# Patient Record
Sex: MALE | Race: WHITE | NOT HISPANIC OR LATINO
[De-identification: names, ages, dates, MRNs, and addresses within clinical notes are randomized per-mention and may not be internally consistent; named-entity substitution may affect disease eponyms.]

---

## 2018-02-07 ENCOUNTER — APPOINTMENT (OUTPATIENT)
Dept: PEDIATRIC SURGERY | Facility: CLINIC | Age: 23
End: 2018-02-07
Payer: COMMERCIAL

## 2018-02-07 VITALS
BODY MASS INDEX: 27.49 KG/M2 | HEIGHT: 65.98 IN | SYSTOLIC BLOOD PRESSURE: 117 MMHG | HEART RATE: 74 BPM | WEIGHT: 171.08 LBS | DIASTOLIC BLOOD PRESSURE: 48 MMHG

## 2018-02-07 PROCEDURE — 99213 OFFICE O/P EST LOW 20 MIN: CPT

## 2018-02-14 ENCOUNTER — OUTPATIENT (OUTPATIENT)
Dept: OUTPATIENT SERVICES | Facility: HOSPITAL | Age: 23
LOS: 1 days | End: 2018-02-14

## 2018-02-14 VITALS
TEMPERATURE: 98 F | WEIGHT: 175.05 LBS | HEART RATE: 84 BPM | DIASTOLIC BLOOD PRESSURE: 68 MMHG | SYSTOLIC BLOOD PRESSURE: 100 MMHG | HEIGHT: 67 IN | RESPIRATION RATE: 14 BRPM

## 2018-02-14 DIAGNOSIS — Q38.0 CONGENITAL MALFORMATIONS OF LIPS, NOT ELSEWHERE CLASSIFIED: Chronic | ICD-10-CM

## 2018-02-14 DIAGNOSIS — L98.8 OTHER SPECIFIED DISORDERS OF THE SKIN AND SUBCUTANEOUS TISSUE: ICD-10-CM

## 2018-02-14 LAB
HCT VFR BLD CALC: 41.2 % — SIGNIFICANT CHANGE UP (ref 39–50)
HGB BLD-MCNC: 14.2 G/DL — SIGNIFICANT CHANGE UP (ref 13–17)
MCHC RBC-ENTMCNC: 31.4 PG — SIGNIFICANT CHANGE UP (ref 27–34)
MCHC RBC-ENTMCNC: 34.5 % — SIGNIFICANT CHANGE UP (ref 32–36)
MCV RBC AUTO: 91.2 FL — SIGNIFICANT CHANGE UP (ref 80–100)
NRBC # FLD: 0 — SIGNIFICANT CHANGE UP
PLATELET # BLD AUTO: 158 K/UL — SIGNIFICANT CHANGE UP (ref 150–400)
PMV BLD: 13.2 FL — HIGH (ref 7–13)
RBC # BLD: 4.52 M/UL — SIGNIFICANT CHANGE UP (ref 4.2–5.8)
RBC # FLD: 12.6 % — SIGNIFICANT CHANGE UP (ref 10.3–14.5)
WBC # BLD: 10.09 K/UL — SIGNIFICANT CHANGE UP (ref 3.8–10.5)
WBC # FLD AUTO: 10.09 K/UL — SIGNIFICANT CHANGE UP (ref 3.8–10.5)

## 2018-02-14 RX ORDER — SODIUM CHLORIDE 9 MG/ML
1000 INJECTION, SOLUTION INTRAVENOUS
Qty: 0 | Refills: 0 | Status: DISCONTINUED | OUTPATIENT
Start: 2018-02-16 | End: 2018-03-03

## 2018-02-14 NOTE — H&P PST ADULT - LYMPHATIC
supraclavicular R/supraclavicular L/posterior cervical L/posterior cervical R/anterior cervical R/anterior cervical L

## 2018-02-14 NOTE — H&P PST ADULT - HISTORY OF PRESENT ILLNESS
This is a 22 y.o. male who presented to Dr Sosa complaining of pain at base of spine . Pt evaluated . Pt has pilonidal cyst. Pt no for surgery . This is a 22 y.o. male who presented to Dr Sosa complaining of pain at base of spine . Pt evaluated . Pt has pilonidal cyst. Pt now for surgery .

## 2018-02-14 NOTE — H&P PST ADULT - NSANTHOSAYNRD_GEN_A_CORE
No. MILLI screening performed.  STOP BANG Legend: 0-2 = LOW Risk; 3-4 = INTERMEDIATE Risk; 5-8 = HIGH Risk

## 2018-02-14 NOTE — H&P PST ADULT - MUSCULOSKELETAL
details… detailed exam no joint erythema/normal strength/ROM intact/no joint warmth/no calf tenderness/no joint swelling

## 2018-02-16 ENCOUNTER — RESULT REVIEW (OUTPATIENT)
Age: 23
End: 2018-02-16

## 2018-02-16 ENCOUNTER — OUTPATIENT (OUTPATIENT)
Dept: OUTPATIENT SERVICES | Facility: HOSPITAL | Age: 23
LOS: 1 days | Discharge: HOME | End: 2018-02-16
Payer: COMMERCIAL

## 2018-02-16 VITALS
RESPIRATION RATE: 16 BRPM | DIASTOLIC BLOOD PRESSURE: 52 MMHG | SYSTOLIC BLOOD PRESSURE: 118 MMHG | HEART RATE: 76 BPM | OXYGEN SATURATION: 100 %

## 2018-02-16 VITALS
HEART RATE: 75 BPM | TEMPERATURE: 98 F | RESPIRATION RATE: 16 BRPM | DIASTOLIC BLOOD PRESSURE: 52 MMHG | WEIGHT: 175.05 LBS | OXYGEN SATURATION: 98 % | HEIGHT: 67 IN | SYSTOLIC BLOOD PRESSURE: 111 MMHG

## 2018-02-16 DIAGNOSIS — Q38.0 CONGENITAL MALFORMATIONS OF LIPS, NOT ELSEWHERE CLASSIFIED: Chronic | ICD-10-CM

## 2018-02-16 DIAGNOSIS — L98.8 OTHER SPECIFIED DISORDERS OF THE SKIN AND SUBCUTANEOUS TISSUE: ICD-10-CM

## 2018-02-16 PROCEDURE — 88304 TISSUE EXAM BY PATHOLOGIST: CPT | Mod: 26

## 2018-02-16 PROCEDURE — 11772 EXC PILONIDAL CYST COMP: CPT

## 2018-02-16 RX ORDER — IBUPROFEN 200 MG
3 TABLET ORAL
Qty: 0 | Refills: 0 | COMMUNITY

## 2018-02-16 RX ADMIN — SODIUM CHLORIDE 30 MILLILITER(S): 9 INJECTION, SOLUTION INTRAVENOUS at 13:35

## 2018-02-16 NOTE — CHART NOTE - NSCHARTNOTEFT_GEN_A_CORE
BRIEF OP NOTE    PreOpDx: Pilonidal cyst, recurrent  PostOp Dx: same  Procedure: minimal excision of recurrent pilonidal cyst  Findings: Multiple pits noted along midline cleft. Multiple punch biopsies performed encircling pits, tracts curetted. Tissue sent as specimen, containing hair  Specimen: pilonidal cyst contents  EBL: 15cc  Condition post op: stable  Dispo: PACU, home

## 2018-02-16 NOTE — ASU DISCHARGE PLAN (ADULT/PEDIATRIC). - MEDICATION SUMMARY - MEDICATIONS TO TAKE
I will START or STAY ON the medications listed below when I get home from the hospital:    Advil Liquigel 200 mg oral capsule  -- 3 cap(s) by mouth every 4 hours. Last dose 2/2/18  -- Indication: For pain control

## 2018-02-16 NOTE — ASU DISCHARGE PLAN (ADULT/PEDIATRIC). - NOTIFY
Fever greater than 101/Unable to Urinate/Pain not relieved by Medications/Swelling that continues/Bleeding that does not stop Increased Irritability or Sluggishness/Inability to Tolerate Liquids or Foods/Persistent Nausea and Vomiting/Unable to Urinate/Bleeding that does not stop/Swelling that continues/Pain not relieved by Medications/Fever greater than 101

## 2018-02-16 NOTE — ASU DISCHARGE PLAN (ADULT/PEDIATRIC). - SPECIAL INSTRUCTIONS
WOUND CARE: Please keep the area clean and dry. You may shower tomorrow. Allow soap and water to run over the surgical site, without scrubbing directly on incision. Pat dry gently.    PAIN CONTROL: Please take Tylenol or Advil for pain control, as instructed on the packaging. Do not exceed maximum doses. If pain is not controlled with these medications, please call the office.    SCHOOL: You may return to school/work as tolerated.    FOLLOW UP: Please follow up with Dr. Sosa in 2 weeks. You may call the office to make an appointment at your convenience.

## 2018-02-18 ENCOUNTER — TRANSCRIPTION ENCOUNTER (OUTPATIENT)
Age: 23
End: 2018-02-18

## 2018-02-22 LAB — SURGICAL PATHOLOGY STUDY: SIGNIFICANT CHANGE UP

## 2018-03-12 ENCOUNTER — APPOINTMENT (OUTPATIENT)
Dept: PEDIATRIC SURGERY | Facility: CLINIC | Age: 23
End: 2018-03-12

## 2018-03-12 ENCOUNTER — APPOINTMENT (OUTPATIENT)
Dept: PEDIATRIC SURGERY | Facility: CLINIC | Age: 23
End: 2018-03-12
Payer: COMMERCIAL

## 2018-03-12 VITALS
BODY MASS INDEX: 27.43 KG/M2 | DIASTOLIC BLOOD PRESSURE: 58 MMHG | HEIGHT: 65.91 IN | HEART RATE: 58 BPM | WEIGHT: 168.65 LBS | SYSTOLIC BLOOD PRESSURE: 103 MMHG

## 2018-03-12 PROCEDURE — 99024 POSTOP FOLLOW-UP VISIT: CPT

## 2018-04-23 ENCOUNTER — APPOINTMENT (OUTPATIENT)
Dept: PEDIATRIC SURGERY | Facility: CLINIC | Age: 23
End: 2018-04-23
Payer: COMMERCIAL

## 2018-04-23 PROCEDURE — 99024 POSTOP FOLLOW-UP VISIT: CPT

## 2018-05-10 ENCOUNTER — APPOINTMENT (OUTPATIENT)
Dept: PEDIATRIC SURGERY | Facility: CLINIC | Age: 23
End: 2018-05-10
Payer: COMMERCIAL

## 2018-05-10 VITALS — WEIGHT: 170.2 LBS | TEMPERATURE: 98.06 F

## 2018-05-10 PROCEDURE — 11772 EXC PILONIDAL CYST COMP: CPT | Mod: 76,58

## 2018-05-10 PROCEDURE — 99024 POSTOP FOLLOW-UP VISIT: CPT

## 2022-01-21 ENCOUNTER — EMERGENCY (EMERGENCY)
Facility: HOSPITAL | Age: 27
LOS: 1 days | Discharge: HOME | End: 2022-01-21
Attending: STUDENT IN AN ORGANIZED HEALTH CARE EDUCATION/TRAINING PROGRAM | Admitting: STUDENT IN AN ORGANIZED HEALTH CARE EDUCATION/TRAINING PROGRAM
Payer: COMMERCIAL

## 2022-01-21 VITALS
DIASTOLIC BLOOD PRESSURE: 68 MMHG | SYSTOLIC BLOOD PRESSURE: 138 MMHG | OXYGEN SATURATION: 100 % | TEMPERATURE: 97 F | RESPIRATION RATE: 16 BRPM | HEIGHT: 67 IN | HEART RATE: 79 BPM

## 2022-01-21 VITALS
HEART RATE: 86 BPM | SYSTOLIC BLOOD PRESSURE: 110 MMHG | RESPIRATION RATE: 16 BRPM | OXYGEN SATURATION: 100 % | DIASTOLIC BLOOD PRESSURE: 66 MMHG | TEMPERATURE: 99 F

## 2022-01-21 DIAGNOSIS — Q38.0 CONGENITAL MALFORMATIONS OF LIPS, NOT ELSEWHERE CLASSIFIED: Chronic | ICD-10-CM

## 2022-01-21 LAB
ALBUMIN SERPL ELPH-MCNC: 4.8 G/DL — SIGNIFICANT CHANGE UP (ref 3.3–5)
ALP SERPL-CCNC: 60 U/L — SIGNIFICANT CHANGE UP (ref 40–120)
ALT FLD-CCNC: 21 U/L — SIGNIFICANT CHANGE UP (ref 4–41)
ANION GAP SERPL CALC-SCNC: 10 MMOL/L — SIGNIFICANT CHANGE UP (ref 7–14)
ANISOCYTOSIS BLD QL: SLIGHT — SIGNIFICANT CHANGE UP
AST SERPL-CCNC: 34 U/L — SIGNIFICANT CHANGE UP (ref 4–40)
BASOPHILS # BLD AUTO: 0.17 K/UL — SIGNIFICANT CHANGE UP (ref 0–0.2)
BASOPHILS NFR BLD AUTO: 1.7 % — SIGNIFICANT CHANGE UP (ref 0–2)
BILIRUB SERPL-MCNC: 0.5 MG/DL — SIGNIFICANT CHANGE UP (ref 0.2–1.2)
BUN SERPL-MCNC: 17 MG/DL — SIGNIFICANT CHANGE UP (ref 7–23)
CALCIUM SERPL-MCNC: 9.4 MG/DL — SIGNIFICANT CHANGE UP (ref 8.4–10.5)
CHLORIDE SERPL-SCNC: 103 MMOL/L — SIGNIFICANT CHANGE UP (ref 98–107)
CO2 SERPL-SCNC: 23 MMOL/L — SIGNIFICANT CHANGE UP (ref 22–31)
CREAT SERPL-MCNC: 0.88 MG/DL — SIGNIFICANT CHANGE UP (ref 0.5–1.3)
EOSINOPHIL # BLD AUTO: 0 K/UL — SIGNIFICANT CHANGE UP (ref 0–0.5)
EOSINOPHIL NFR BLD AUTO: 0 % — SIGNIFICANT CHANGE UP (ref 0–6)
GIANT PLATELETS BLD QL SMEAR: PRESENT — SIGNIFICANT CHANGE UP
GLUCOSE SERPL-MCNC: 95 MG/DL — SIGNIFICANT CHANGE UP (ref 70–99)
HCT VFR BLD CALC: 45.9 % — SIGNIFICANT CHANGE UP (ref 39–50)
HGB BLD-MCNC: 15.7 G/DL — SIGNIFICANT CHANGE UP (ref 13–17)
IANC: 7.12 K/UL — SIGNIFICANT CHANGE UP (ref 1.5–8.5)
LYMPHOCYTES # BLD AUTO: 2.22 K/UL — SIGNIFICANT CHANGE UP (ref 1–3.3)
LYMPHOCYTES # BLD AUTO: 22.5 % — SIGNIFICANT CHANGE UP (ref 13–44)
MACROCYTES BLD QL: SLIGHT — SIGNIFICANT CHANGE UP
MCHC RBC-ENTMCNC: 30.4 PG — SIGNIFICANT CHANGE UP (ref 27–34)
MCHC RBC-ENTMCNC: 34.2 GM/DL — SIGNIFICANT CHANGE UP (ref 32–36)
MCV RBC AUTO: 89 FL — SIGNIFICANT CHANGE UP (ref 80–100)
MONOCYTES # BLD AUTO: 0.4 K/UL — SIGNIFICANT CHANGE UP (ref 0–0.9)
MONOCYTES NFR BLD AUTO: 4.1 % — SIGNIFICANT CHANGE UP (ref 2–14)
NEUTROPHILS # BLD AUTO: 6.65 K/UL — SIGNIFICANT CHANGE UP (ref 1.8–7.4)
NEUTROPHILS NFR BLD AUTO: 67.5 % — SIGNIFICANT CHANGE UP (ref 43–77)
PLAT MORPH BLD: NORMAL — SIGNIFICANT CHANGE UP
PLATELET # BLD AUTO: 169 K/UL — SIGNIFICANT CHANGE UP (ref 150–400)
PLATELET COUNT - ESTIMATE: NORMAL — SIGNIFICANT CHANGE UP
POIKILOCYTOSIS BLD QL AUTO: SLIGHT — SIGNIFICANT CHANGE UP
POLYCHROMASIA BLD QL SMEAR: SLIGHT — SIGNIFICANT CHANGE UP
POTASSIUM SERPL-MCNC: 5.4 MMOL/L — HIGH (ref 3.5–5.3)
POTASSIUM SERPL-SCNC: 5.4 MMOL/L — HIGH (ref 3.5–5.3)
PROT SERPL-MCNC: 7.9 G/DL — SIGNIFICANT CHANGE UP (ref 6–8.3)
RBC # BLD: 5.16 M/UL — SIGNIFICANT CHANGE UP (ref 4.2–5.8)
RBC # FLD: 12.3 % — SIGNIFICANT CHANGE UP (ref 10.3–14.5)
RBC BLD AUTO: ABNORMAL
SODIUM SERPL-SCNC: 136 MMOL/L — SIGNIFICANT CHANGE UP (ref 135–145)
VARIANT LYMPHS # BLD: 4.2 % — SIGNIFICANT CHANGE UP (ref 0–6)
WBC # BLD: 9.85 K/UL — SIGNIFICANT CHANGE UP (ref 3.8–10.5)
WBC # FLD AUTO: 9.85 K/UL — SIGNIFICANT CHANGE UP (ref 3.8–10.5)

## 2022-01-21 PROCEDURE — 99284 EMERGENCY DEPT VISIT MOD MDM: CPT | Mod: 25

## 2022-01-21 PROCEDURE — 73130 X-RAY EXAM OF HAND: CPT | Mod: 26,RT

## 2022-01-21 PROCEDURE — 29125 APPL SHORT ARM SPLINT STATIC: CPT

## 2022-01-21 NOTE — ED PROVIDER NOTE - ATTENDING CONTRIBUTION TO CARE
I have personally seen and examined this patient.  I have fully participated in the care of this patient. I performed a substantive portion of the visit including all aspects of the medical decision making. I have reviewed all pertinent clinical information, including history, physical exam, plan and the ACP’s note and agree except as noted. - MD Musa.    27 yo M, without significant PMH, p/w right hand, bruise and discoloration of the distal finger on 2nd-3rd finger, found this morning. denies trauma but + snaff box tenderness, potent scaphoid injury, will get xray and follow up for another xray in 5-7 days. Unsure etiology, dxx incuding spontaneous bleeding or microvascular construction, plt count will be checked, but no clinical sings of other critical conditions such as flexor tendinitis. xray, cbc, d/c.

## 2022-01-21 NOTE — ED ADULT TRIAGE NOTE - CHIEF COMPLAINT QUOTE
pt stating woke up this morning with right hand "finger tips and palm bruising", denies injury or pain at this time. Capillary refill <3, denies loss of sensation

## 2022-01-21 NOTE — ED PROVIDER NOTE - OBJECTIVE STATEMENT
27 yo non smoker, no PMHX here with complaint of discoloration to right hand x this morning, woke this morning noticed discoloration/ blue in color to base of thumb and 4th and 5th finger tip, now with improvement, however notice now a dry area to 3rd mid finger with some discoloration to skin. Denies fever, cp ,sob, swelling, injury, trauma, new medications. Denies h/o bleeding/ clotting d/o in himself or family.

## 2022-01-21 NOTE — ED PROVIDER NOTE - CLINICAL SUMMARY MEDICAL DECISION MAKING FREE TEXT BOX
25 yo non smoker, no PMHX here with complaint of discoloration to right hand x this morning, woke this morning noticed discoloration/ blue in color to base of thumb and 4th and 5th finger tip, now with improvement, however notice now a dry area to 3rd mid finger with some discoloration to skin.  xray labs, hand f/u

## 2022-01-21 NOTE — ED PROVIDER NOTE - PATIENT PORTAL LINK FT
You can access the FollowMyHealth Patient Portal offered by [facility] by registering at the following website: [email or web address]. By joining [email or web address]’s FollowMyHealth portal, you will also be able to view your health information using other applications (apps) compatible with our system.

## 2022-01-21 NOTE — ED PROVIDER NOTE - NSFOLLOWUPINSTRUCTIONS_ED_ALL_ED_FT
- take another hand xray in 5-7 days to r/o scaphoid fracture  - warm up, hot pads, follow up with plastic/hand out pt office.

## 2022-01-21 NOTE — ED ADULT NURSE NOTE - OBJECTIVE STATEMENT
25y/o male aaox4 and ambulatory c/o discoloration/ blue in color to base of thumb and 4th and 5th finger tip upon waking up this morning. Pt states now area shows improvement. Denies numbness/tingling, chest pain, sob. Vital signs as noted. Labs collected and sent by PA.

## 2022-01-21 NOTE — ED ADULT NURSE NOTE - NSIMPLEMENTINTERV_GEN_ALL_ED
Implemented All Universal Safety Interventions:  [place] to call system. Call bell, personal items and telephone within reach. Instruct patient to call for assistance. Room bathroom lighting operational. Non-slip footwear when patient is off stretcher. Physically safe environment: no spills, clutter or unnecessary equipment. Stretcher in lowest position, wheels locked, appropriate side rails in place.

## 2022-01-21 NOTE — ED PROVIDER NOTE - SKIN, MLM
Right hand with irregular small areas on 4th and 5th finger tip of hyperemia, faint, non diffuse, as well over thenar eminence. warm, dry and intact. No evidence of rash.

## 2023-03-24 ENCOUNTER — APPOINTMENT (OUTPATIENT)
Dept: UROLOGY | Facility: CLINIC | Age: 28
End: 2023-03-24
Payer: COMMERCIAL

## 2023-03-24 VITALS
WEIGHT: 185 LBS | BODY MASS INDEX: 29.73 KG/M2 | DIASTOLIC BLOOD PRESSURE: 78 MMHG | HEIGHT: 66 IN | SYSTOLIC BLOOD PRESSURE: 132 MMHG

## 2023-03-24 PROCEDURE — 99204 OFFICE O/P NEW MOD 45 MIN: CPT

## 2023-03-24 NOTE — ASSESSMENT
[FreeTextEntry1] : Mr. Sterling is a very pleasant 27 year old man here today for evaluation for pain with ejaculation, right testicular pain.\par We discussed potential etiologies of his symptoms\par UC.\par UA.\par GC/chlamydia.\par Ureaplasma/mycoplasma.\par Ibuprofen 600 mg BID.\par Warm baths/warming blanket 15 mins BID.\par Patient was reassured that the likelihood of an infection and cancer is very low\par RTO scrotal US 2 weeks.\par

## 2023-03-24 NOTE — HISTORY OF PRESENT ILLNESS
[Currently Experiencing ___] :  [unfilled] [2] : 2 [FreeTextEntry1] : Mr. Sterling is a very pleasant 27 year old man here today for evaluation for right testicular pain, pain with ejaculation\par He reports that this has been going on for the past year.\par He reports no pain with lifting weights; but notices increased urinary frequency with exercise.\par Additionally reports a few episodes of right inguinal throbbing pain separate from testicular pain.\par Reports its is intermittent in nature.\par Denies any obvious swelling, tenderness to touch or change in sensation.\par Additionally reports pain with ejaculation.\par Reports a strong urinary stream.\par Reports PCP did urine studies and renal US recently which did not demonstrate any evidence of kidney stones nor other abnormalities\par Denies any dysuria.\par Denies any personal or family history of kidney stones.\par Denies any family history of urological cancers.\par Denies any history of smoking.\par

## 2023-03-24 NOTE — PHYSICAL EXAM
[General Appearance - Well Developed] : well developed [General Appearance - Well Nourished] : well nourished [Normal Appearance] : normal appearance [Well Groomed] : well groomed [General Appearance - In No Acute Distress] : no acute distress [Edema] : no peripheral edema [Respiration, Rhythm And Depth] : normal respiratory rhythm and effort [Exaggerated Use Of Accessory Muscles For Inspiration] : no accessory muscle use [Abdomen Soft] : soft [Abdomen Tenderness] : non-tender [Costovertebral Angle Tenderness] : no ~M costovertebral angle tenderness [Normal Station and Gait] : the gait and station were normal for the patient's age [] : no rash [No Focal Deficits] : no focal deficits [Affect] : the affect was normal [Oriented To Time, Place, And Person] : oriented to person, place, and time [Mood] : the mood was normal [Not Anxious] : not anxious [No Palpable Adenopathy] : no palpable adenopathy [Urethral Meatus] : meatus normal [Penis Abnormality] : normal uncircumcised penis [Urinary Bladder Findings] : the bladder was normal on palpation [Testes Mass (___cm)] : there were no testicular masses

## 2023-03-27 LAB
APPEARANCE: CLEAR
BACTERIA UR CULT: NORMAL
BACTERIA: NEGATIVE
BILIRUBIN URINE: NEGATIVE
BLOOD URINE: NEGATIVE
C TRACH RRNA SPEC QL NAA+PROBE: NOT DETECTED
COLOR: NORMAL
GLUCOSE QUALITATIVE U: NEGATIVE
HYALINE CASTS: 0 /LPF
KETONES URINE: NEGATIVE
LEUKOCYTE ESTERASE URINE: NEGATIVE
MICROSCOPIC-UA: NORMAL
N GONORRHOEA RRNA SPEC QL NAA+PROBE: NOT DETECTED
NITRITE URINE: NEGATIVE
PH URINE: 6
PROTEIN URINE: NORMAL
RED BLOOD CELLS URINE: 1 /HPF
SOURCE AMPLIFICATION: NORMAL
SPECIFIC GRAVITY URINE: 1.03
SQUAMOUS EPITHELIAL CELLS: 0 /HPF
UROBILINOGEN URINE: NORMAL
WHITE BLOOD CELLS URINE: 0 /HPF

## 2023-03-31 ENCOUNTER — NON-APPOINTMENT (OUTPATIENT)
Age: 28
End: 2023-03-31

## 2023-04-13 LAB
MYCOPLASMA HOMINIS CULTURE: NEGATIVE
UREAPLASMA CULTURE: NEGATIVE

## 2023-04-14 ENCOUNTER — APPOINTMENT (OUTPATIENT)
Dept: UROLOGY | Facility: CLINIC | Age: 28
End: 2023-04-14
Payer: COMMERCIAL

## 2023-04-14 VITALS
HEIGHT: 66 IN | HEART RATE: 73 BPM | BODY MASS INDEX: 29.73 KG/M2 | SYSTOLIC BLOOD PRESSURE: 112 MMHG | WEIGHT: 185 LBS | TEMPERATURE: 208.22 F | DIASTOLIC BLOOD PRESSURE: 64 MMHG | OXYGEN SATURATION: 98 %

## 2023-04-14 DIAGNOSIS — N41.1 ACUTE PROSTATITIS: ICD-10-CM

## 2023-04-14 DIAGNOSIS — N41.0 ACUTE PROSTATITIS: ICD-10-CM

## 2023-04-14 DIAGNOSIS — N50.819 TESTICULAR PAIN, UNSPECIFIED: ICD-10-CM

## 2023-04-14 PROCEDURE — 76870 US EXAM SCROTUM: CPT

## 2023-04-14 PROCEDURE — 99213 OFFICE O/P EST LOW 20 MIN: CPT

## 2023-04-14 NOTE — HISTORY OF PRESENT ILLNESS
[FreeTextEntry1] : Very pleasant 27-year-old gentleman who presents for follow-up of scrotal pain with new finding today of bilateral small varicoceles.  He feels well.  He reports that his pain has improved.  He reports no testicular pain in the recent past.  He denies dysuria.  No hematuria.  No perineal discomfort.  No other complaints.

## 2023-04-14 NOTE — ASSESSMENT
[FreeTextEntry1] : Very pleasant 27-year-old gentleman who presents for follow-up of scrotal pain, prostatitis, new finding of bilateral varicoceles\par -We discussed pathophysiology of bilateral varicoceles\par -Scrotal ultrasound images reviewed demonstrating bilateral small varicoceles\par -Urine culture negative\par -GC/chlamydia negative\par -Ureaplasma/mycoplasma negative\par -Urinalysis demonstrates 1 red blood cell per high-power field\par -Follow-up as needed\par

## 2024-06-11 ENCOUNTER — APPOINTMENT (OUTPATIENT)
Dept: PEDIATRIC SURGERY | Facility: CLINIC | Age: 29
End: 2024-06-11

## 2024-06-11 VITALS — BODY MASS INDEX: 31.35 KG/M2 | WEIGHT: 194.23 LBS | TEMPERATURE: 98.4 F

## 2024-06-11 DIAGNOSIS — L98.8 OTHER SPECIFIED DISORDERS OF THE SKIN AND SUBCUTANEOUS TISSUE: ICD-10-CM

## 2024-06-11 PROCEDURE — XXXXX: CPT | Mod: 1L

## 2024-06-11 NOTE — CONSULT LETTER
[Dear  ___] : Dear  [unfilled], [Consult Letter:] : I had the pleasure of evaluating your patient, [unfilled]. [Please see my note below.] : Please see my note below. [Consult Closing:] : Thank you very much for allowing me to participate in the care of this patient.  If you have any questions, please do not hesitate to contact me. [Sincerely,] : Sincerely, [FreeTextEntry2] : Eduardo Shaffer MD [FreeTextEntry3] :  Mendoza Sosa MD FAAP FACS  Director, [facility]  Division of Pediatric General, Thoracic and Endoscopic Surgery  [facility]

## 2024-06-11 NOTE — ADDENDUM
[FreeTextEntry1] : Documented by Leobardo Garvey acting as a Scribe for THOR HARLEY on 06/11/2024.   All medical record entries made by the Scribe were at my direction and personally dictated by me, THOR HARLEY, on 06/11/2024. I have reviewed the chart and agree that the record accurately reflects my personal performances of the history, physical exam, assessment and plan. I have also personally directed, reviewed, and agree with the discharge instructions.

## 2024-06-11 NOTE — HISTORY OF PRESENT ILLNESS
[FreeTextEntry1] : Nicolas is a 29-year-old male with a history of pilonidal disease s/p cleft lift procedure (03/2014) and minimal excision procedure (02/2018), here today to be re-evaluated for recurring pilonidal cysts. He was last seen in clinic in May 2018 at which time he presented with persistent blood and discharge appreciated in the affected area post-operatively. An I&D was subsequently performed in-office. Today he reports pain and discomfort in the gluteal cleft for the last 3 months at the site of prior excision. He denies any associated bleeding or discharge. Denies any fevers. No issues with urination or bowel movements.

## 2024-06-11 NOTE — PHYSICAL EXAM
[Midline pits] : midline pits [NL] : grossly intact [TextBox_59] : 1 midline pit: swelling in sacrococcygeal region

## 2024-06-11 NOTE — ASSESSMENT
[FreeTextEntry1] : Nicolas is a 29-year-old male with a history of pilonidal disease s/p cleft lift procedure (03/2014) and minimal excision procedure (02/2018), here today to be re-evaluated for recurring cysts. He was last seen in clinic in May 2018 at which time he presented with persistent blood and discharge appreciated in the affected area post-operatively and an I&D was subsequently performed in-office. Today he reports pain and discomfort in the gluteal cleft for the last 3 months at the site of prior excision. On exam, there is 1 midline pit above the gluteal cleft along with hair and debris; swelling in the area, but no evidence of active infection. I re-educated Nicolas on expectant management with regular hair removal and proper hygiene and reviewed surgical options for long term treatment including a second minimal excision procedure. He is interested in scheduling the minimal excision when convenient for him. The risks discussed included but were not limited to bleeding, infection, injury to adjacent structures, and recurrent/persistent disease. He knows to contact me sooner should he develop any new or concerning symptoms or should he have any other questions prior to the procedure.

## 2024-06-11 NOTE — REASON FOR VISIT
[Initial - Scheduled] : an initial, scheduled visit with concerns of [Pilonidal disease] : pilonidal disease  [Patient] : patient [FreeTextEntry4] : Eduardo Shaffer MD

## 2024-11-09 ENCOUNTER — EMERGENCY (EMERGENCY)
Facility: HOSPITAL | Age: 29
LOS: 1 days | Discharge: HOME | End: 2024-11-09
Attending: EMERGENCY MEDICINE
Payer: COMMERCIAL

## 2024-11-09 VITALS
HEART RATE: 81 BPM | SYSTOLIC BLOOD PRESSURE: 104 MMHG | OXYGEN SATURATION: 99 % | TEMPERATURE: 98 F | RESPIRATION RATE: 18 BRPM | DIASTOLIC BLOOD PRESSURE: 68 MMHG

## 2024-11-09 VITALS
HEIGHT: 66 IN | DIASTOLIC BLOOD PRESSURE: 69 MMHG | WEIGHT: 190.04 LBS | RESPIRATION RATE: 18 BRPM | HEART RATE: 85 BPM | OXYGEN SATURATION: 98 % | TEMPERATURE: 99 F | SYSTOLIC BLOOD PRESSURE: 128 MMHG

## 2024-11-09 DIAGNOSIS — Q38.0 CONGENITAL MALFORMATIONS OF LIPS, NOT ELSEWHERE CLASSIFIED: Chronic | ICD-10-CM

## 2024-11-09 LAB
ALBUMIN SERPL ELPH-MCNC: 4.6 G/DL — SIGNIFICANT CHANGE UP (ref 3.3–5)
ALP SERPL-CCNC: 74 U/L — SIGNIFICANT CHANGE UP (ref 40–120)
ALT FLD-CCNC: 33 U/L — SIGNIFICANT CHANGE UP (ref 10–45)
ANION GAP SERPL CALC-SCNC: 13 MMOL/L — SIGNIFICANT CHANGE UP (ref 5–17)
APPEARANCE UR: CLEAR — SIGNIFICANT CHANGE UP
AST SERPL-CCNC: 20 U/L — SIGNIFICANT CHANGE UP (ref 10–40)
BACTERIA # UR AUTO: NEGATIVE /HPF — SIGNIFICANT CHANGE UP
BASOPHILS # BLD AUTO: 0.01 K/UL — SIGNIFICANT CHANGE UP (ref 0–0.2)
BASOPHILS NFR BLD AUTO: 0.2 % — SIGNIFICANT CHANGE UP (ref 0–2)
BILIRUB SERPL-MCNC: 0.6 MG/DL — SIGNIFICANT CHANGE UP (ref 0.2–1.2)
BILIRUB UR-MCNC: NEGATIVE — SIGNIFICANT CHANGE UP
BUN SERPL-MCNC: 15 MG/DL — SIGNIFICANT CHANGE UP (ref 7–23)
CALCIUM SERPL-MCNC: 9.4 MG/DL — SIGNIFICANT CHANGE UP (ref 8.4–10.5)
CAST: 0 /LPF — SIGNIFICANT CHANGE UP (ref 0–4)
CHLORIDE SERPL-SCNC: 100 MMOL/L — SIGNIFICANT CHANGE UP (ref 96–108)
CK SERPL-CCNC: 99 U/L — SIGNIFICANT CHANGE UP (ref 30–200)
CO2 SERPL-SCNC: 25 MMOL/L — SIGNIFICANT CHANGE UP (ref 22–31)
COLOR SPEC: YELLOW — SIGNIFICANT CHANGE UP
CREAT SERPL-MCNC: 1.17 MG/DL — SIGNIFICANT CHANGE UP (ref 0.5–1.3)
DIFF PNL FLD: NEGATIVE — SIGNIFICANT CHANGE UP
EGFR: 88 ML/MIN/1.73M2 — SIGNIFICANT CHANGE UP
EOSINOPHIL # BLD AUTO: 0.02 K/UL — SIGNIFICANT CHANGE UP (ref 0–0.5)
EOSINOPHIL NFR BLD AUTO: 0.4 % — SIGNIFICANT CHANGE UP (ref 0–6)
FLUAV AG NPH QL: SIGNIFICANT CHANGE UP
FLUBV AG NPH QL: SIGNIFICANT CHANGE UP
GLUCOSE SERPL-MCNC: 87 MG/DL — SIGNIFICANT CHANGE UP (ref 70–99)
GLUCOSE UR QL: NEGATIVE MG/DL — SIGNIFICANT CHANGE UP
HCT VFR BLD CALC: 45.1 % — SIGNIFICANT CHANGE UP (ref 39–50)
HGB BLD-MCNC: 15.3 G/DL — SIGNIFICANT CHANGE UP (ref 13–17)
IMM GRANULOCYTES NFR BLD AUTO: 0.4 % — SIGNIFICANT CHANGE UP (ref 0–0.9)
KETONES UR-MCNC: NEGATIVE MG/DL — SIGNIFICANT CHANGE UP
LEUKOCYTE ESTERASE UR-ACNC: NEGATIVE — SIGNIFICANT CHANGE UP
LYMPHOCYTES # BLD AUTO: 1.12 K/UL — SIGNIFICANT CHANGE UP (ref 1–3.3)
LYMPHOCYTES # BLD AUTO: 20 % — SIGNIFICANT CHANGE UP (ref 13–44)
MCHC RBC-ENTMCNC: 29.1 PG — SIGNIFICANT CHANGE UP (ref 27–34)
MCHC RBC-ENTMCNC: 33.9 G/DL — SIGNIFICANT CHANGE UP (ref 32–36)
MCV RBC AUTO: 85.9 FL — SIGNIFICANT CHANGE UP (ref 80–100)
MONOCYTES # BLD AUTO: 0.55 K/UL — SIGNIFICANT CHANGE UP (ref 0–0.9)
MONOCYTES NFR BLD AUTO: 9.8 % — SIGNIFICANT CHANGE UP (ref 2–14)
NEUTROPHILS # BLD AUTO: 3.87 K/UL — SIGNIFICANT CHANGE UP (ref 1.8–7.4)
NEUTROPHILS NFR BLD AUTO: 69.2 % — SIGNIFICANT CHANGE UP (ref 43–77)
NITRITE UR-MCNC: NEGATIVE — SIGNIFICANT CHANGE UP
NRBC # BLD: 0 /100 WBCS — SIGNIFICANT CHANGE UP (ref 0–0)
PH UR: 6.5 — SIGNIFICANT CHANGE UP (ref 5–8)
PLATELET # BLD AUTO: 140 K/UL — LOW (ref 150–400)
POTASSIUM SERPL-MCNC: 4 MMOL/L — SIGNIFICANT CHANGE UP (ref 3.5–5.3)
POTASSIUM SERPL-SCNC: 4 MMOL/L — SIGNIFICANT CHANGE UP (ref 3.5–5.3)
PROT SERPL-MCNC: 7.4 G/DL — SIGNIFICANT CHANGE UP (ref 6–8.3)
PROT UR-MCNC: NEGATIVE MG/DL — SIGNIFICANT CHANGE UP
RBC # BLD: 5.25 M/UL — SIGNIFICANT CHANGE UP (ref 4.2–5.8)
RBC # FLD: 12.6 % — SIGNIFICANT CHANGE UP (ref 10.3–14.5)
RBC CASTS # UR COMP ASSIST: 2 /HPF — SIGNIFICANT CHANGE UP (ref 0–4)
RSV RNA NPH QL NAA+NON-PROBE: SIGNIFICANT CHANGE UP
SARS-COV-2 RNA SPEC QL NAA+PROBE: SIGNIFICANT CHANGE UP
SODIUM SERPL-SCNC: 138 MMOL/L — SIGNIFICANT CHANGE UP (ref 135–145)
SP GR SPEC: 1.02 — SIGNIFICANT CHANGE UP (ref 1–1.03)
SQUAMOUS # UR AUTO: 0 /HPF — SIGNIFICANT CHANGE UP (ref 0–5)
UROBILINOGEN FLD QL: 1 MG/DL — SIGNIFICANT CHANGE UP (ref 0.2–1)
WBC # BLD: 5.59 K/UL — SIGNIFICANT CHANGE UP (ref 3.8–10.5)
WBC # FLD AUTO: 5.59 K/UL — SIGNIFICANT CHANGE UP (ref 3.8–10.5)
WBC UR QL: 0 /HPF — SIGNIFICANT CHANGE UP (ref 0–5)

## 2024-11-09 PROCEDURE — 99285 EMERGENCY DEPT VISIT HI MDM: CPT

## 2024-11-09 PROCEDURE — 82550 ASSAY OF CK (CPK): CPT

## 2024-11-09 PROCEDURE — 87637 SARSCOV2&INF A&B&RSV AMP PRB: CPT

## 2024-11-09 PROCEDURE — 36415 COLL VENOUS BLD VENIPUNCTURE: CPT

## 2024-11-09 PROCEDURE — 96375 TX/PRO/DX INJ NEW DRUG ADDON: CPT

## 2024-11-09 PROCEDURE — 96374 THER/PROPH/DIAG INJ IV PUSH: CPT | Mod: XU

## 2024-11-09 PROCEDURE — 85025 COMPLETE CBC W/AUTO DIFF WBC: CPT

## 2024-11-09 PROCEDURE — 74177 CT ABD & PELVIS W/CONTRAST: CPT | Mod: MC

## 2024-11-09 PROCEDURE — 81001 URINALYSIS AUTO W/SCOPE: CPT

## 2024-11-09 PROCEDURE — 99284 EMERGENCY DEPT VISIT MOD MDM: CPT | Mod: 25

## 2024-11-09 PROCEDURE — 87086 URINE CULTURE/COLONY COUNT: CPT

## 2024-11-09 PROCEDURE — 74177 CT ABD & PELVIS W/CONTRAST: CPT | Mod: 26,MC

## 2024-11-09 PROCEDURE — 80053 COMPREHEN METABOLIC PANEL: CPT

## 2024-11-09 PROCEDURE — 83690 ASSAY OF LIPASE: CPT

## 2024-11-09 RX ORDER — ONDANSETRON HYDROCHLORIDE 2 MG/ML
4 INJECTION, SOLUTION INTRAMUSCULAR; INTRAVENOUS ONCE
Refills: 0 | Status: COMPLETED | OUTPATIENT
Start: 2024-11-09 | End: 2024-11-09

## 2024-11-09 RX ORDER — KETOROLAC TROMETHAMINE 30 MG/ML
15 INJECTION INTRAMUSCULAR; INTRAVENOUS ONCE
Refills: 0 | Status: DISCONTINUED | OUTPATIENT
Start: 2024-11-09 | End: 2024-11-09

## 2024-11-09 RX ORDER — ACETAMINOPHEN 500 MG
1000 TABLET ORAL ONCE
Refills: 0 | Status: COMPLETED | OUTPATIENT
Start: 2024-11-09 | End: 2024-11-09

## 2024-11-09 RX ORDER — SODIUM CHLORIDE 9 MG/ML
1000 INJECTION, SOLUTION INTRAMUSCULAR; INTRAVENOUS; SUBCUTANEOUS ONCE
Refills: 0 | Status: COMPLETED | OUTPATIENT
Start: 2024-11-09 | End: 2024-11-09

## 2024-11-09 RX ADMIN — SODIUM CHLORIDE 1000 MILLILITER(S): 9 INJECTION, SOLUTION INTRAMUSCULAR; INTRAVENOUS; SUBCUTANEOUS at 13:01

## 2024-11-09 RX ADMIN — KETOROLAC TROMETHAMINE 15 MILLIGRAM(S): 30 INJECTION INTRAMUSCULAR; INTRAVENOUS at 17:36

## 2024-11-09 RX ADMIN — Medication 400 MILLIGRAM(S): at 13:01

## 2024-11-09 RX ADMIN — ONDANSETRON HYDROCHLORIDE 4 MILLIGRAM(S): 2 INJECTION, SOLUTION INTRAMUSCULAR; INTRAVENOUS at 13:01

## 2024-11-09 NOTE — ED PROVIDER NOTE - PROGRESS NOTE DETAILS
Attending MD Landon: Patient signed out to Dr. Samaniego pending results of CT abdomen pelvis Felecia Zambrano PGY3: Pt reassessed and resting comfortably. CT shows terminal ileitis. Pt okay for DC home at this time. DC instructions and return precautions discussed. Questions answered.

## 2024-11-09 NOTE — ED PROVIDER NOTE - NSFOLLOWUPINSTRUCTIONS_ED_ALL_ED_FT
You were seen in the ED today for flu like symptoms and abdominal pain.    Your work up included labs and cat scan. Your results are included in your paperwork.    Please follow up with your PCP within the next 1 week.     If you experience any of the following please return to the ED:  - Chest pain  - Trouble breathing  - New or worsening pain  - Inability to keep down food or water  - Blood in vomit or stool

## 2024-11-09 NOTE — ED PROVIDER NOTE - PATIENT PORTAL LINK FT
You can access the FollowMyHealth Patient Portal offered by [facility] by registering at the following website: [email or web address]. By joining [employer]’s FollowMyHealth portal, you will also be able to view your health information using other applications (apps) compatible with our system.

## 2024-11-09 NOTE — ED PROVIDER NOTE - ATTENDING CONTRIBUTION TO CARE
Attending MD Landon:  I have seen and examined this patient and fully participated in the care of this patient as the teaching attending. I personally made/approved the management plan and take responsibility for the patient management.      29-year-old gentleman with no known medical history is presenting for evaluation of illness since last night.  He states he had symptoms that started with bodyaches headache and flulike symptoms.  Maybe a mild cough however not really today.  Denies rhinorrhea nasal congestion or throat pain.  He developed last night as well right-sided abdominal pain.  He states the sensation feels like something being "off" in that area with occasional shooting pains.  He also has pain in the back as well on the right side.  Patient states he had nausea but no emesis.  He had a fever to 100 last night.  Patient states that he also had unusual odor to his urine and reported the urine being very yellow or orange in color but no rachael blood.    Patient's vital signs are within normal limits.  He is sitting in the stretcher fatigued appearing but nontoxic.  The abdomen is soft nondistended there is focal tenderness to palpation in the right lower quadrant with some voluntary guarding no rebound tenderness.  There is no CVA tenderness bilaterally.  Clear lungs posteriorly regular heart sounds no obvious murmur.  Extremities are warm to the touch.    Considerations at this point given tenderness in the right lower quadrant include appendicitis, UTI/pyelonephritis, infected ureteral stone, colitis, viral syndrome.  Plan will be to obtain abdominal CT scan to exclude appendicitis screening labs flu/COVID swab analgesia antiemetics and reassessment.      *The above represents an initial assessment/impression. Please refer to progress notes for potential changes in patient clinical course*

## 2024-11-09 NOTE — ED PROVIDER NOTE - CLINICAL SUMMARY MEDICAL DECISION MAKING FREE TEXT BOX
29-year-old male with no previous past medical history presents emergency department for 2 days of flulike symptoms, including headache, chills, fevers, myalgias, abdominal pain.  Patient well-appearing.  Lungs clear bilaterally.  Tenderness in right lower quadrant without rebound or guarding.  Considerations include UTI, pyelonephritis, viral illness, appendicitis.  Plan for labs, UA/UC, CTAP.  Will give IV fluids, Zofran and IV Tylenol and reassess.  Dispo pending labs and imaging.

## 2024-11-09 NOTE — ED PROVIDER NOTE - OBJECTIVE STATEMENT
29-year-old male with no previous past medical history presents emergency department for 2 days of flulike symptoms.  Patient states last night he started having fevers, chills, headache and myalgias.  Patient also endorses intermittent right lower abdominal pain with some nausea.  Patient took Tylenol and Motrin last night with brief relief of symptoms.  Patient endorsing bilateral posterior thigh pain that he describes as aching.  Patient denies vision changes, neck pain, chest pain, trouble breathing, vomiting, diarrhea/constipation, dysuria, hematuria.  Patient does endorse 1 episode of foul-smelling urine yesterday that has since resolved.

## 2024-11-09 NOTE — ED PROVIDER NOTE - PHYSICAL EXAMINATION
Constitutional: VS reviewed. Alert and orientedx3, well appearing, no apparent distress  HEENT: Atraumatic, EOMI, PERRL, neck supple  CV: RRR  Lungs: Clear and equal bilaterally, no wheezes, rales or crackles  Abdomen: Soft, nondistended, + RLQ TTP without rebound or guarding. No CVA TTP   MSK: No deformities  Skin: Warm and dry. As visualized no rashes, lesions, bruising or erythema  Neuro: Strength and sensation intact.   Lymph: No pitting edema in extremities.

## 2024-11-10 LAB
CULTURE RESULTS: SIGNIFICANT CHANGE UP
SPECIMEN SOURCE: SIGNIFICANT CHANGE UP